# Patient Record
Sex: FEMALE | Race: WHITE | URBAN - METROPOLITAN AREA
[De-identification: names, ages, dates, MRNs, and addresses within clinical notes are randomized per-mention and may not be internally consistent; named-entity substitution may affect disease eponyms.]

---

## 2019-09-20 ENCOUNTER — EMERGENCY (EMERGENCY)
Facility: HOSPITAL | Age: 44
LOS: 1 days | Discharge: HOME | End: 2019-09-20
Attending: EMERGENCY MEDICINE
Payer: COMMERCIAL

## 2019-09-20 VITALS
DIASTOLIC BLOOD PRESSURE: 75 MMHG | OXYGEN SATURATION: 100 % | HEART RATE: 76 BPM | SYSTOLIC BLOOD PRESSURE: 118 MMHG | HEIGHT: 59 IN | WEIGHT: 115.08 LBS | TEMPERATURE: 98 F | RESPIRATION RATE: 18 BRPM

## 2019-09-20 LAB
ALBUMIN SERPL ELPH-MCNC: 4.9 G/DL — SIGNIFICANT CHANGE UP (ref 3.3–5.2)
ALP SERPL-CCNC: 49 U/L — SIGNIFICANT CHANGE UP (ref 40–120)
ALT FLD-CCNC: 9 U/L — SIGNIFICANT CHANGE UP
ANION GAP SERPL CALC-SCNC: 13 MMOL/L — SIGNIFICANT CHANGE UP (ref 5–17)
AST SERPL-CCNC: 16 U/L — SIGNIFICANT CHANGE UP
BASOPHILS # BLD AUTO: 0.04 K/UL — SIGNIFICANT CHANGE UP (ref 0–0.2)
BASOPHILS NFR BLD AUTO: 0.4 % — SIGNIFICANT CHANGE UP (ref 0–2)
BILIRUB SERPL-MCNC: 0.2 MG/DL — LOW (ref 0.4–2)
BUN SERPL-MCNC: 20 MG/DL — SIGNIFICANT CHANGE UP (ref 8–20)
CALCIUM SERPL-MCNC: 9.7 MG/DL — SIGNIFICANT CHANGE UP (ref 8.6–10.2)
CHLORIDE SERPL-SCNC: 100 MMOL/L — SIGNIFICANT CHANGE UP (ref 98–107)
CK SERPL-CCNC: 109 U/L — SIGNIFICANT CHANGE UP (ref 25–170)
CO2 SERPL-SCNC: 25 MMOL/L — SIGNIFICANT CHANGE UP (ref 22–29)
CREAT SERPL-MCNC: 0.86 MG/DL — SIGNIFICANT CHANGE UP (ref 0.5–1.3)
EOSINOPHIL # BLD AUTO: 0.15 K/UL — SIGNIFICANT CHANGE UP (ref 0–0.5)
EOSINOPHIL NFR BLD AUTO: 1.6 % — SIGNIFICANT CHANGE UP (ref 0–6)
GLUCOSE SERPL-MCNC: 134 MG/DL — HIGH (ref 70–115)
HCG SERPL-ACNC: <4 MIU/ML — SIGNIFICANT CHANGE UP
HCT VFR BLD CALC: 38.9 % — SIGNIFICANT CHANGE UP (ref 34.5–45)
HGB BLD-MCNC: 12.8 G/DL — SIGNIFICANT CHANGE UP (ref 11.5–15.5)
IMM GRANULOCYTES NFR BLD AUTO: 0.3 % — SIGNIFICANT CHANGE UP (ref 0–1.5)
LYMPHOCYTES # BLD AUTO: 1.76 K/UL — SIGNIFICANT CHANGE UP (ref 1–3.3)
LYMPHOCYTES # BLD AUTO: 18.4 % — SIGNIFICANT CHANGE UP (ref 13–44)
MCHC RBC-ENTMCNC: 29.4 PG — SIGNIFICANT CHANGE UP (ref 27–34)
MCHC RBC-ENTMCNC: 32.9 GM/DL — SIGNIFICANT CHANGE UP (ref 32–36)
MCV RBC AUTO: 89.2 FL — SIGNIFICANT CHANGE UP (ref 80–100)
MONOCYTES # BLD AUTO: 0.67 K/UL — SIGNIFICANT CHANGE UP (ref 0–0.9)
MONOCYTES NFR BLD AUTO: 7 % — SIGNIFICANT CHANGE UP (ref 2–14)
NEUTROPHILS # BLD AUTO: 6.89 K/UL — SIGNIFICANT CHANGE UP (ref 1.8–7.4)
NEUTROPHILS NFR BLD AUTO: 72.3 % — SIGNIFICANT CHANGE UP (ref 43–77)
PLATELET # BLD AUTO: 223 K/UL — SIGNIFICANT CHANGE UP (ref 150–400)
POTASSIUM SERPL-MCNC: 4.5 MMOL/L — SIGNIFICANT CHANGE UP (ref 3.5–5.3)
POTASSIUM SERPL-SCNC: 4.5 MMOL/L — SIGNIFICANT CHANGE UP (ref 3.5–5.3)
PROT SERPL-MCNC: 8.3 G/DL — SIGNIFICANT CHANGE UP (ref 6.6–8.7)
RBC # BLD: 4.36 M/UL — SIGNIFICANT CHANGE UP (ref 3.8–5.2)
RBC # FLD: 12.5 % — SIGNIFICANT CHANGE UP (ref 10.3–14.5)
SODIUM SERPL-SCNC: 138 MMOL/L — SIGNIFICANT CHANGE UP (ref 135–145)
TROPONIN T SERPL-MCNC: <0.01 NG/ML — SIGNIFICANT CHANGE UP (ref 0–0.06)
WBC # BLD: 9.54 K/UL — SIGNIFICANT CHANGE UP (ref 3.8–10.5)
WBC # FLD AUTO: 9.54 K/UL — SIGNIFICANT CHANGE UP (ref 3.8–10.5)

## 2019-09-20 PROCEDURE — 99218: CPT

## 2019-09-20 PROCEDURE — 93010 ELECTROCARDIOGRAM REPORT: CPT

## 2019-09-20 PROCEDURE — 99284 EMERGENCY DEPT VISIT MOD MDM: CPT

## 2019-09-20 RX ORDER — SODIUM CHLORIDE 9 MG/ML
3 INJECTION INTRAMUSCULAR; INTRAVENOUS; SUBCUTANEOUS ONCE
Refills: 0 | Status: COMPLETED | OUTPATIENT
Start: 2019-09-20 | End: 2019-09-20

## 2019-09-20 RX ORDER — SODIUM CHLORIDE 9 MG/ML
1000 INJECTION INTRAMUSCULAR; INTRAVENOUS; SUBCUTANEOUS ONCE
Refills: 0 | Status: COMPLETED | OUTPATIENT
Start: 2019-09-20 | End: 2019-09-20

## 2019-09-20 RX ADMIN — SODIUM CHLORIDE 3 MILLILITER(S): 9 INJECTION INTRAMUSCULAR; INTRAVENOUS; SUBCUTANEOUS at 22:58

## 2019-09-20 RX ADMIN — SODIUM CHLORIDE 1000 MILLILITER(S): 9 INJECTION INTRAMUSCULAR; INTRAVENOUS; SUBCUTANEOUS at 23:58

## 2019-09-20 RX ADMIN — SODIUM CHLORIDE 1000 MILLILITER(S): 9 INJECTION INTRAMUSCULAR; INTRAVENOUS; SUBCUTANEOUS at 22:58

## 2019-09-20 NOTE — ED ADULT TRIAGE NOTE - CHIEF COMPLAINT QUOTE
patient c/o syncope today at a restaurant after eating shrimp and vomited. patient admitted to drinking alcohol x 2 drinks.  is with her. denies any medical problems.

## 2019-09-20 NOTE — ED PROVIDER NOTE - OBJECTIVE STATEMENT
Pt is a 43 yo female w/ a PMHx of vertigo that presented to the ER with a chief complaint of syncope. Pt stated that she and her  had been eating dinner when she started to feel dizzy and nauseous. She went to the bathroom and vomited once but still felt dizzy afterward, although the vomiting relieved her nausea. Within 5 mins, pt's  states that pt looked as though "color had drained from her face," and then she lost balance and passed out. Pt's  states that she was unarousable for about a minute. Pt presently states that she feels lightheaded. Pt denies SOB, diarrhea, abdominal pain, chest pain or trauma.

## 2019-09-20 NOTE — ED PROVIDER NOTE - CLINICAL SUMMARY MEDICAL DECISION MAKING FREE TEXT BOX
Will obtain blood work and cardiac enzymes. Will observe for a few hours and follow up with patient accordingly.

## 2019-09-20 NOTE — ED PROVIDER NOTE - ATTENDING CONTRIBUTION TO CARE
45 yo F presents to ED with  s/p syncopal episode after developing sudden onset of dizziness followed by  nausea and multiple episodes while eating at restaurant.  Pt's  states pt turned blue and was pulseless for "30 seconds.  No CPR initialed and pt became aroused after being shaken by her .  No reported seizure activity or incontinence.  Pt denies any prior episodes of syncope.  Pt believes she was seen by [facility] 10 years ago for stress and echo.  On exam pt awake and alert in NAD, PERRL, throat clear, mm moist, Neck supple, Cor Reg with occ ectopy, Lungs clear b/l, Abd soft, NT, BS+, Ext FROM, Neuro CN 2-12 intact, MS 5/5 b/l UE/LE's.  EKG sinus with occ PVC no acute changes.  Will monitor. check serial CE, place on OBS and have pt eval by [facility] 43 yo F presents to ED with  s/p syncopal episode after developing sudden onset of dizziness followed by  nausea and multiple episodes while eating at restaurant.  Pt's  states pt turned blue and was pulseless for "30 seconds.  No CPR initialed and pt became aroused after being shaken by her .  No reported seizure activity or incontinence.  Pt denies any prior episodes of syncope.  Pt believes she was seen by [facility] 10 years ago for stress and echo.  On exam pt awake and alert in NAD, PERRL, throat clear, mm moist, Neck supple, Cor Reg with occ ectopy, Lungs clear b/l, Abd soft, NT, BS+, Ext FROM, Neuro CN 2-12 intact, MS 5/5 b/l UE/LE's.  EKG sinus with occ PVC no acute changes.  Will monitor. check serial CE, place on OBS and have pt eval by [facility]

## 2019-09-21 VITALS
RESPIRATION RATE: 17 BRPM | DIASTOLIC BLOOD PRESSURE: 75 MMHG | TEMPERATURE: 98 F | SYSTOLIC BLOOD PRESSURE: 119 MMHG | OXYGEN SATURATION: 98 % | HEART RATE: 68 BPM

## 2019-09-21 LAB
BASOPHILS # BLD AUTO: 0.04 K/UL — SIGNIFICANT CHANGE UP (ref 0–0.2)
BASOPHILS NFR BLD AUTO: 0.4 % — SIGNIFICANT CHANGE UP (ref 0–2)
EOSINOPHIL # BLD AUTO: 0.07 K/UL — SIGNIFICANT CHANGE UP (ref 0–0.5)
EOSINOPHIL NFR BLD AUTO: 0.7 % — SIGNIFICANT CHANGE UP (ref 0–6)
HCT VFR BLD CALC: 32.2 % — LOW (ref 34.5–45)
HGB BLD-MCNC: 10.7 G/DL — LOW (ref 11.5–15.5)
IMM GRANULOCYTES NFR BLD AUTO: 0.3 % — SIGNIFICANT CHANGE UP (ref 0–1.5)
LYMPHOCYTES # BLD AUTO: 1.87 K/UL — SIGNIFICANT CHANGE UP (ref 1–3.3)
LYMPHOCYTES # BLD AUTO: 19.9 % — SIGNIFICANT CHANGE UP (ref 13–44)
MCHC RBC-ENTMCNC: 29.4 PG — SIGNIFICANT CHANGE UP (ref 27–34)
MCHC RBC-ENTMCNC: 33.2 GM/DL — SIGNIFICANT CHANGE UP (ref 32–36)
MCV RBC AUTO: 88.5 FL — SIGNIFICANT CHANGE UP (ref 80–100)
MONOCYTES # BLD AUTO: 0.67 K/UL — SIGNIFICANT CHANGE UP (ref 0–0.9)
MONOCYTES NFR BLD AUTO: 7.1 % — SIGNIFICANT CHANGE UP (ref 2–14)
NEUTROPHILS # BLD AUTO: 6.72 K/UL — SIGNIFICANT CHANGE UP (ref 1.8–7.4)
NEUTROPHILS NFR BLD AUTO: 71.6 % — SIGNIFICANT CHANGE UP (ref 43–77)
PLATELET # BLD AUTO: 197 K/UL — SIGNIFICANT CHANGE UP (ref 150–400)
RBC # BLD: 3.64 M/UL — LOW (ref 3.8–5.2)
RBC # FLD: 12.8 % — SIGNIFICANT CHANGE UP (ref 10.3–14.5)
TROPONIN T SERPL-MCNC: <0.01 NG/ML — SIGNIFICANT CHANGE UP (ref 0–0.06)
TROPONIN T SERPL-MCNC: <0.01 NG/ML — SIGNIFICANT CHANGE UP (ref 0–0.06)
WBC # BLD: 9.4 K/UL — SIGNIFICANT CHANGE UP (ref 3.8–10.5)
WBC # FLD AUTO: 9.4 K/UL — SIGNIFICANT CHANGE UP (ref 3.8–10.5)

## 2019-09-21 PROCEDURE — 84484 ASSAY OF TROPONIN QUANT: CPT

## 2019-09-21 PROCEDURE — 84702 CHORIONIC GONADOTROPIN TEST: CPT

## 2019-09-21 PROCEDURE — 96360 HYDRATION IV INFUSION INIT: CPT

## 2019-09-21 PROCEDURE — 99284 EMERGENCY DEPT VISIT MOD MDM: CPT | Mod: 25

## 2019-09-21 PROCEDURE — 99217: CPT

## 2019-09-21 PROCEDURE — 85027 COMPLETE CBC AUTOMATED: CPT

## 2019-09-21 PROCEDURE — 36415 COLL VENOUS BLD VENIPUNCTURE: CPT

## 2019-09-21 PROCEDURE — 82550 ASSAY OF CK (CPK): CPT

## 2019-09-21 PROCEDURE — 80053 COMPREHEN METABOLIC PANEL: CPT

## 2019-09-21 PROCEDURE — 93005 ELECTROCARDIOGRAM TRACING: CPT

## 2019-09-21 PROCEDURE — G0378: CPT

## 2019-09-21 RX ORDER — SODIUM CHLORIDE 9 MG/ML
1000 INJECTION INTRAMUSCULAR; INTRAVENOUS; SUBCUTANEOUS ONCE
Refills: 0 | Status: COMPLETED | OUTPATIENT
Start: 2019-09-21 | End: 2019-09-21

## 2019-09-21 RX ADMIN — SODIUM CHLORIDE 1000 MILLILITER(S): 9 INJECTION INTRAMUSCULAR; INTRAVENOUS; SUBCUTANEOUS at 02:13

## 2019-09-21 NOTE — ED CDU PROVIDER SUBSEQUENT DAY NOTE - PROGRESS NOTE DETAILS
43 y/o F presented to ED with episode of syncope after eating last night.  Notes and results reviewed.  Troponin negative x 3, [facility] cardiology consult pending.  Patient has had no further episodes, denies CP, dizziness, SOB.

## 2019-09-21 NOTE — CONSULT NOTE ADULT - ASSESSMENT
45 y/o F PMH carpal tunnel surgery, vertigo, presents to ED after syncopal episode.  at bedside, pt states went out to eat, had some shrimp and a stanislaw, started to feel nauseous, sweaty, went to bathroom vomited twice. Left bathroom became dizzy, states not similar to vertigo; then walked outside, as per  turned pale and lost consciousness for approximately 1-2 minutes.       Has had cardiac workup in the past, "20years ago" for "extra beats" everything normal at that time (RHODA, stress). Most recent stress 1-2 years ago, pt states normal.    Syncope  - syncopal episode after nausea/vomiting   - back to baseline   - EKG- SR, PVCs, no ischemia/infarct  - Telemetry- SR, PVCs  - Troponin negative x3  - Recent stress test- treadmill, normal as per pt.       (pending attending attestation/evaluation- Dr. Lindquist) 43 y/o F PMH carpal tunnel surgery, vertigo, presents to ED after syncopal episode.  at bedside, pt states went out to eat, had some shrimp and a stanislaw, started to feel nauseous, sweaty, went to bathroom vomited twice. Left bathroom became dizzy, states not similar to vertigo; then walked outside, as per  turned pale and lost consciousness for approximately 1-2 minutes.    Syncope  - syncopal episode after nausea/vomiting   - back to baseline   - EKG- SR, PVCs, no ischemia/infarct  - Telemetry- SR, PVCs  - Troponin negative x3  - Recent stress test- treadmill, normal as per pt.     (pending attending attestation/evaluation- Dr. Lindquist) 43 y/o F PMH carpal tunnel surgery, vertigo, presents to ED after syncopal episode.  at bedside, pt states went out to eat, had some shrimp and a stanislaw, started to feel nauseous, sweaty, went to bathroom vomited twice. Left bathroom became dizzy, states not similar to vertigo; then walked outside, as per  turned pale and lost consciousness for approximately 1-2 minutes.    Syncope  - syncopal episode after nausea/vomiting   - back to baseline   - EKG- SR, PVCs, no ischemia/infarct  - Telemetry- SR, PVCs  - Troponin negative x3  - Recent stress test- treadmill, normal as per pt.   - recommend out pt work up- TTE, holter monitor- pt can follow up with her cardiologist- [facility]

## 2019-09-21 NOTE — ED CDU PROVIDER DISPOSITION NOTE - ATTENDING CONTRIBUTION TO CARE
I agree with the PA's note and was available for any issues/concerns. I was directly involved in patient care. My brief overall assessment is as follows: pt feeling better, seen by cards and CT surg, cleared, has close f/u for 2 days, return precautions and results. effusion not worsening. I agree with the PA's note and was available for any issues/concerns. I was directly involved in patient care. My brief overall assessment is as follows: pt feeling better, seen and cleared by cardiology, no telemetry events, has close f/u, return precautions and results. exam wnl.

## 2019-09-21 NOTE — ED CDU PROVIDER INITIAL DAY NOTE - OBJECTIVE STATEMENT
Pt is a 45 yo female w/ a PMHx of vertigo that presented to the ER with a chief complaint of syncope. Pt stated that she and her  had been eating dinner when she started to feel dizzy and nauseous. She went to the bathroom and vomited once but still felt dizzy afterward, although the vomiting relieved her nausea. Within 5 mins, pt's  states that pt looked as though "color had drained from her face," and then she lost balance and passed out. Pt's  states that she was unarousable for about a minute. Pt presently states that she feels lightheaded. Pt denies SOB, diarrhea, abdominal pain, chest pain or trauma.

## 2019-09-21 NOTE — CONSULT NOTE ADULT - SUBJECTIVE AND OBJECTIVE BOX
Patient is a 44y old  Female who presents with a chief complaint of syncope    HPI: 43 y/o F PMH carpal tunnel surgery, vertigo, presents to ED after syncopal episode.  at bedside, pt states went out to eat, had some shrimp and a stanislaw, started to feel nauseous, sweaty, went to bathroom vomited twice. Left bathroom became dizzy, states not similar to vertigo; then walked outside, as per  turned pale and lost consciousness for approximately 1-2 minutes. Denies fever, chills, cough, phlegm production, shortness of breath, dyspnea on exertion, orthopnea, PND, edema, chest pain, pressure, irregular, fast heart beat, melena, rectal bleed, hematuria. Has had cardiac workup in the past, "20years ago" for "extra beats" everything normal at that time (RHODA, stress). Most recent stress 1-2 years ago, pt states normal.       PAST MEDICAL & SURGICAL HISTORY:  Vertigo  No significant past surgical history      PREVIOUS DIAGNOSTIC TESTING:        Allergies  No Known Allergies  Intolerances      MEDICATIONS  (STANDING):    MEDICATIONS  (PRN):      FAMILY HISTORY:  Denies Significant family medical history; no known history sudden cardiac death    SOCIAL HISTORY:  CIGARETTES:  ALCOHOL: Social       REVIEW OF SYSTEMS:  CONSTITUTIONAL: No fever, weight loss, or fatigue  EYES: No eye pain, visual disturbances, or discharge  ENMT:  No difficulty hearing, tinnitus, vertigo; No sinus or throat pain  NECK: No pain or stiffness  RESPIRATORY: No cough, wheezing, chills or hemoptysis; No Shortness of Breath  CARDIOVASCULAR: + SYNCOPE, + DIZZINESS No chest pain, palpitations, or leg swelling  GASTROINTESTINAL: + NAUSEA + VOMITING  No abdominal or epigastric pain or hematemesis; No diarrhea or constipation. No melena or hematochezia.  GENITOURINARY: No dysuria, frequency, hematuria, or incontinence  NEUROLOGICAL: No headaches, memory loss, loss of strength, numbness, or tremors  SKIN: No itching, burning, rashes, or lesions   LYMPH Nodes: No enlarged glands  ENDOCRINE: No heat or cold intolerance; No hair loss  MUSCULOSKELETAL: No joint pain or swelling; No muscle, back, or extremity pain  PSYCHIATRIC: No depression, anxiety, mood swings, or difficulty sleeping  HEME/LYMPH: No easy bruising, or bleeding gums  ALLERY AND IMMUNOLOGIC: No hives or eczema	        Vital Signs Last 24 Hrs  T(C): 37.1 (21 Sep 2019 02:02), Max: 37.1 (21 Sep 2019 02:02)  T(F): 98.7 (21 Sep 2019 02:02), Max: 98.7 (21 Sep 2019 02:02)  HR: 75 (21 Sep 2019 06:38) (75 - 95)  BP: 113/76 (21 Sep 2019 06:38) (113/76 - 118/75)  RR: 16 (21 Sep 2019 06:38) (16 - 18)  SpO2: 98% (21 Sep 2019 06:38) (98% - 100%)        Daily Height in cm: 149.86 (20 Sep 2019 21:23)          PHYSICAL EXAM:  Appearance: Normal, well nourished	  HEENT:   Normal oral mucosa, PERRL, EOMI, sclera non-icteric	  Lymphatic: No cervical lymphadenopathy  Cardiovascular: Normal S1 S2, No JVD, No cardiac murmurs, No carotid bruits, No peripheral edema  Respiratory: Lungs clear to auscultation	  Psychiatry: A & O x 3, Mood & affect appropriate  Gastrointestinal:  Soft, Non-tender, + BS, no bruits	  Skin: No rashes, No ecchymoses, No cyanosis  Neurologic: Grossly non-focal with full strength in all four extremities  Extremities: Normal range of motion, No clubbing, cyanosis or edema  Vascular: Peripheral pulses palpable 2+ bilaterally      INTERPRETATION OF TELEMETRY: SR, PVCs  ECG: SR, PVCs, NSST-T abnl    LABS:                        10.7   9.40  )-----------( 197      ( 21 Sep 2019 06:26 )             32.2     09-20    138  |  100  |  20.0  ----------------------------<  134<H>  4.5   |  25.0  |  0.86    Ca    9.7      20 Sep 2019 23:04    TPro  8.3  /  Alb  4.9  /  TBili  0.2<L>  /  DBili  x   /  AST  16  /  ALT  9   /  AlkPhos  49  09-20    CARDIAC MARKERS ( 21 Sep 2019 06:26 )  x     / <0.01 ng/mL / x     / x     / x      CARDIAC MARKERS ( 21 Sep 2019 02:21 )  x     / <0.01 ng/mL / x     / x     / x      CARDIAC MARKERS ( 20 Sep 2019 23:04 )  x     / <0.01 ng/mL / 109 U/L / x     / x        RADIOLOGY & ADDITIONAL STUDIES:

## 2019-09-21 NOTE — ED ADULT NURSE REASSESSMENT NOTE - NS ED NURSE REASSESS COMMENT FT1
Report received from offgoing RN, care assumed at this time. Patient A/O x4,  denies any CP, SOB, dizziness, numbness/tingling or any other acute complaints at this time. NSR noted on cardiac monitor. NAD noted at this time. Awaiting cardiology. Purposeful, proactive rounding ongoing.
Handoff received from offgoing RN. Charting as noted. Pt. received AxOx4, resting in stretcher, in NAD. Pt. in NSR on cardiac monitor. Vital signs stable and as charted. Pending repeat troponin sent. Additional NS bolus ordered administered. Pt. offers no complaints at this time. RN educated and updated pt on plan of care. Additional labs ordered for 0600. Pt verbalized understanding. RN will continue to update pt on plan of care. Pt. safety and comfort measures maintained.

## 2019-09-21 NOTE — ED CDU PROVIDER DISPOSITION NOTE - CLINICAL COURSE
43 y/o F had episode of syncope after eating.  Troponin negative x 3, seen by [facility] and cleared for outpatient f/u.  Patient has been asymptomatic while in ED.

## 2019-09-21 NOTE — ED CDU PROVIDER INITIAL DAY NOTE - MEDICAL DECISION MAKING DETAILS
Pt is a 45 yo female w/ a PMHx of vertigo that presented to the ER with a chief complaint of syncope. will do serial troponins, cardio consult, [facility]

## 2019-09-21 NOTE — ED CDU PROVIDER SUBSEQUENT DAY NOTE - ATTENDING CONTRIBUTION TO CARE
I agree with the PA's note and was available for any issues/concerns. I was directly involved in patient care. My brief overall assessment is as follows: serial trops and cards consult. no acute changes.

## 2019-09-21 NOTE — ED CDU PROVIDER DISPOSITION NOTE - PATIENT PORTAL LINK FT
You can access the FollowMyHealth Patient Portal offered by [facility] by registering at the following website: [email or web address]. By joining [facility]’s FollowMyHealth portal, you will also be able to view your health information using other applications (apps) compatible with our system.

## 2019-09-21 NOTE — CONSULT NOTE ADULT - ATTENDING COMMENTS
Pt seen and examined.   She is healthy with history of PVCs  First episode of syncope in setting of nausea and vomiting. No cp or palpitations.   no prior family history of CAD, SCD.  EKG normal with pvc. no events on tele  Pt has a cardiologist, [facility].  Labs and vitals reviewed.  Vasovagal syncope in setting of n/v.  Advised hydration.  FU with cardiologist next week  thank you  Pt can be dc home from our stand point.  I reviewed the above and agree with a/p.